# Patient Record
Sex: MALE | Race: ASIAN | NOT HISPANIC OR LATINO | URBAN - METROPOLITAN AREA
[De-identification: names, ages, dates, MRNs, and addresses within clinical notes are randomized per-mention and may not be internally consistent; named-entity substitution may affect disease eponyms.]

---

## 2024-09-23 ENCOUNTER — EMERGENCY (EMERGENCY)
Facility: HOSPITAL | Age: 39
LOS: 1 days | Discharge: HOME | End: 2024-09-23
Attending: EMERGENCY MEDICINE | Admitting: EMERGENCY MEDICINE
Payer: SELF-PAY

## 2024-09-23 VITALS
HEART RATE: 88 BPM | WEIGHT: 169.98 LBS | DIASTOLIC BLOOD PRESSURE: 113 MMHG | HEIGHT: 70 IN | RESPIRATION RATE: 18 BRPM | OXYGEN SATURATION: 98 % | TEMPERATURE: 98 F | SYSTOLIC BLOOD PRESSURE: 178 MMHG

## 2024-09-23 VITALS — DIASTOLIC BLOOD PRESSURE: 97 MMHG | SYSTOLIC BLOOD PRESSURE: 178 MMHG

## 2024-09-23 LAB
ADD ON TEST-SPECIMEN IN LAB: SIGNIFICANT CHANGE UP
ALBUMIN SERPL ELPH-MCNC: 4.3 G/DL — SIGNIFICANT CHANGE UP (ref 3.3–5)
ALP SERPL-CCNC: 129 U/L — HIGH (ref 40–120)
ALT FLD-CCNC: 20 U/L — SIGNIFICANT CHANGE UP (ref 4–41)
ANION GAP SERPL CALC-SCNC: 15 MMOL/L — HIGH (ref 7–14)
AST SERPL-CCNC: 36 U/L — SIGNIFICANT CHANGE UP (ref 4–40)
BASOPHILS # BLD AUTO: 0.07 K/UL — SIGNIFICANT CHANGE UP (ref 0–0.2)
BASOPHILS NFR BLD AUTO: 0.8 % — SIGNIFICANT CHANGE UP (ref 0–2)
BILIRUB SERPL-MCNC: 0.4 MG/DL — SIGNIFICANT CHANGE UP (ref 0.2–1.2)
BUN SERPL-MCNC: 6 MG/DL — LOW (ref 7–23)
CALCIUM SERPL-MCNC: 9.7 MG/DL — SIGNIFICANT CHANGE UP (ref 8.4–10.5)
CHLORIDE SERPL-SCNC: 97 MMOL/L — LOW (ref 98–107)
CO2 SERPL-SCNC: 24 MMOL/L — SIGNIFICANT CHANGE UP (ref 22–31)
CREAT SERPL-MCNC: 0.64 MG/DL — SIGNIFICANT CHANGE UP (ref 0.5–1.3)
EGFR: 122 ML/MIN/1.73M2 — SIGNIFICANT CHANGE UP
EOSINOPHIL # BLD AUTO: 0.15 K/UL — SIGNIFICANT CHANGE UP (ref 0–0.5)
EOSINOPHIL NFR BLD AUTO: 1.7 % — SIGNIFICANT CHANGE UP (ref 0–6)
GLUCOSE SERPL-MCNC: 202 MG/DL — HIGH (ref 70–99)
HCT VFR BLD CALC: 35.3 % — LOW (ref 39–50)
HGB BLD-MCNC: 11.7 G/DL — LOW (ref 13–17)
IANC: 4.4 K/UL — SIGNIFICANT CHANGE UP (ref 1.8–7.4)
IMM GRANULOCYTES NFR BLD AUTO: 0.1 % — SIGNIFICANT CHANGE UP (ref 0–0.9)
LYMPHOCYTES # BLD AUTO: 3.21 K/UL — SIGNIFICANT CHANGE UP (ref 1–3.3)
LYMPHOCYTES # BLD AUTO: 37.1 % — SIGNIFICANT CHANGE UP (ref 13–44)
MCHC RBC-ENTMCNC: 27 PG — SIGNIFICANT CHANGE UP (ref 27–34)
MCHC RBC-ENTMCNC: 33.1 GM/DL — SIGNIFICANT CHANGE UP (ref 32–36)
MCV RBC AUTO: 81.5 FL — SIGNIFICANT CHANGE UP (ref 80–100)
MONOCYTES # BLD AUTO: 0.82 K/UL — SIGNIFICANT CHANGE UP (ref 0–0.9)
MONOCYTES NFR BLD AUTO: 9.5 % — SIGNIFICANT CHANGE UP (ref 2–14)
NEUTROPHILS # BLD AUTO: 4.4 K/UL — SIGNIFICANT CHANGE UP (ref 1.8–7.4)
NEUTROPHILS NFR BLD AUTO: 50.8 % — SIGNIFICANT CHANGE UP (ref 43–77)
NRBC # BLD: 0 /100 WBCS — SIGNIFICANT CHANGE UP (ref 0–0)
NRBC # FLD: 0 K/UL — SIGNIFICANT CHANGE UP (ref 0–0)
NT-PROBNP SERPL-SCNC: <36 PG/ML — SIGNIFICANT CHANGE UP
PLATELET # BLD AUTO: 359 K/UL — SIGNIFICANT CHANGE UP (ref 150–400)
POTASSIUM SERPL-MCNC: 4 MMOL/L — SIGNIFICANT CHANGE UP (ref 3.5–5.3)
POTASSIUM SERPL-SCNC: 4 MMOL/L — SIGNIFICANT CHANGE UP (ref 3.5–5.3)
PROT SERPL-MCNC: 8.4 G/DL — HIGH (ref 6–8.3)
RBC # BLD: 4.33 M/UL — SIGNIFICANT CHANGE UP (ref 4.2–5.8)
RBC # FLD: 15 % — HIGH (ref 10.3–14.5)
SODIUM SERPL-SCNC: 136 MMOL/L — SIGNIFICANT CHANGE UP (ref 135–145)
TROPONIN T, HIGH SENSITIVITY RESULT: 13 NG/L — SIGNIFICANT CHANGE UP
TROPONIN T, HIGH SENSITIVITY RESULT: 14 NG/L — SIGNIFICANT CHANGE UP
WBC # BLD: 8.66 K/UL — SIGNIFICANT CHANGE UP (ref 3.8–10.5)
WBC # FLD AUTO: 8.66 K/UL — SIGNIFICANT CHANGE UP (ref 3.8–10.5)

## 2024-09-23 PROCEDURE — 71046 X-RAY EXAM CHEST 2 VIEWS: CPT | Mod: 26

## 2024-09-23 PROCEDURE — 93010 ELECTROCARDIOGRAM REPORT: CPT

## 2024-09-23 PROCEDURE — 99285 EMERGENCY DEPT VISIT HI MDM: CPT

## 2024-09-23 NOTE — ED PROVIDER NOTE - CLINICAL SUMMARY MEDICAL DECISION MAKING FREE TEXT BOX
[place], DO (PGY2): 38 y/o male with hx of DM and HTN, not currently seeing a PCP, who presented to the ED today for elevated BP reading at home. Reports that he went drinking last night, so decided to check his BP this morning and found it to be in the 170s systolic. No chest pain, SOB, abdominal pain. No lightheadedness, headache. No URI symptoms. Also no dizziness contrary to triage note.    - Vital signs notable for /113. Patient is afebrile, not tachycardic  - Lying on stretcher in NAD  - A&Ox3 and is well-appearing  - Head is atraumatic, normal conjunctiva  - Neck is supple with normal ROM   - Mucous membranes are moist  - Lungs are clear to auscultation b/l, no wheezing, rales, or rhonchi  - Normal S1, S2, no murmurs, rubs or gallops  - Abdomen is soft and nontender with normal bowel sounds in all 4 quadrants  - No lower extremity edema noted    Patient with asymptomatic HTN. However given risk factors and EKG, will obtain ACS workup. Will also obtain A1C. Dispo and further management pending results and reassessment. [place], DO (PGY2): 40 y/o male with hx of DM and HTN, not currently seeing a PCP, who presented to the ED today for elevated BP reading at home. Reports that he went drinking last night, so decided to check his BP this morning and found it to be in the 170s systolic. No chest pain, SOB, abdominal pain. No lightheadedness, headache. No URI symptoms. Also no dizziness contrary to triage note.    - Vital signs notable for /113. Patient is afebrile, not tachycardic  - Lying on stretcher in NAD  - A&Ox3 and is well-appearing  - Head is atraumatic, normal conjunctiva  - Neck is supple with normal ROM   - Mucous membranes are moist  - Lungs are clear to auscultation b/l, no wheezing, rales, or rhonchi  - Normal S1, S2, no murmurs, rubs or gallops  - Abdomen is soft and nontender with normal bowel sounds in all 4 quadrants  - No lower extremity edema noted    EKG showing NSR with HR 83, NC, 138, QTc 441. Slight STD in leads I and II, АЛЕКСАНДР in V1. Patient with asymptomatic HTN. However given risk factors and EKG, will obtain ACS workup. Will also obtain A1C. Dispo and further management pending results and reassessment.

## 2024-09-23 NOTE — SBIRT NOTE ADULT - NSSBIRTBRIEFINTDET_GEN_A_CORE
Screening results were reviewed with the patient. Patient was provided educational materials on low-risk guidelines and substance use and health. Motivation and goals were discussed. Patient provided with Remote SBIRT information.

## 2024-09-23 NOTE — ED ADULT NURSE NOTE - OBJECTIVE STATEMENT
presents to ED for "high blood pressure." pt sts he had "a couple of shots" yesterday and checked his bp and it was high. Pt sts hx of HTN, but does not follow up w primary MD. denies cp/sob, n/v/d/headache, fever/chills, dizziness. Pt is aao x 4, ambulatory w steady gait. MD cazares now, will cont to monitor.

## 2024-09-23 NOTE — ED PROVIDER NOTE - PROGRESS NOTE DETAILS
[place], DO (PGY2): Trop stable. Patient with A1c of 11.1.  Offered CDU for endocrinology consult.  Patient would like to go home.  Emphasized importance of following up with both primary care and endocrinology history patient verbalized understanding. He otherwise feels well.  He is ambulating without issues. Will DC patient with strict return precautions.

## 2024-09-23 NOTE — ED ADULT NURSE NOTE - MODE OF DISCHARGE
[de-identified] : The patient comes in today for a followup evaluation and reassessment. There are several issues to discuss.\par \par Overall, the patient is stable at this time. He is not taking any medications. He states that he is attempting to exercise on a fairly regular basis. He has lost 2 pounds since last visit. He denies any chest pains pressure or chest tightness while exercising. There are no fevers or night sweats.\par \par With regards to his mild underlying area q.d., he again remains asymptomatic. He does not have any cough or sputum production. There is no overt wheezing. His appetite is good. There is no change in bowel habits. He now comes in for this assessment. Ambulatory

## 2024-09-23 NOTE — ED PROVIDER NOTE - PATIENT PORTAL LINK FT
You can access the FollowMyHealth Patient Portal offered by [facility] by registering at the following website: [email or web address]. By joining [facility] FollowMyHealth portal, you will also be able to view your health information using other applications (apps) compatible with our system.

## 2024-09-23 NOTE — ED PROVIDER NOTE - ATTENDING CONTRIBUTION TO CARE
40 y/o male with hx of DM and HTN, not currently seeing a PCP, who presented to the ED today for elevated BP reading at home. Reports that he went drinking last night, so decided to check his BP this morning and found it to be in the 170s systolic. The pt is asymptomatic at this time.    /113 in triage.    EKG showing NSR with HR 83, [place], 138, QTc 441. Slight STD in leads I and II, АЛЕКСАНДР in V1. Patient with asymptomatic HTN. However given risk factors and EKG, will obtain ACS workup. Will also obtain A1C. Dispo and further management pending results and reassessment.    I performed a history and physical exam of the patient and discussed their management with the resident. I reviewed the resident's note and agree with the documented findings and plan of care. My medical decision making and observations are found above.

## 2024-09-23 NOTE — ED ADULT TRIAGE NOTE - CHIEF COMPLAINT QUOTE
c/o dizziness cine last  night denies c/p neuro deficit. PERRLA extremities are strong and equal B/L. Phx of DM type 2, HTN

## 2024-09-23 NOTE — ED PROVIDER NOTE - NSFOLLOWUPINSTRUCTIONS_ED_ALL_ED_FT
You were seen and evaluated in the Emergency Department for your high blood pressure. You were evaluated clinically and with laboratory studies.    At this time your clinical evaluation and history do not demonstrate any acute, life-threatening medical conditions warranting emergent treatment. However, we strongly recommend you follow up with our outpatient clinic regarding today's visit. Please call the following number to schedule an appointment    *  *  *    Should you develop new symptoms, such as, chest pain, shortness of breath, fevers, chills, nausea, vomiting, diarrhea, or constipation - please return to the ED for immediate evaluation. You were seen in the emergency department today for elevated blood pressure readings.  Your hemoglobin A1c, which is a test that measure the extent of your diabetes, is very elevated.  You will need to follow-up outpatient with both a primary care physician and endocrinologist.  You will get a phone call to schedule an appointment. In case you do not, you may call the number of your discharge paperwork to schedule an appointment.     It is important that you do so as diabetes and hypertension can lead to other issues down the line including kidney issues, heart attacks, strokes, and even death.    Please return to the emergency department if you experience worsening of your symptoms for any of the following: chest pain, shortness of breath, urinary issues, neurological symptoms.    We hope you feel better!  Thank you for trusting us with your care!